# Patient Record
Sex: MALE | Race: WHITE | NOT HISPANIC OR LATINO | URBAN - METROPOLITAN AREA
[De-identification: names, ages, dates, MRNs, and addresses within clinical notes are randomized per-mention and may not be internally consistent; named-entity substitution may affect disease eponyms.]

---

## 2017-03-15 ENCOUNTER — EMERGENCY (EMERGENCY)
Facility: HOSPITAL | Age: 22
LOS: 1 days | End: 2017-03-15
Admitting: EMERGENCY MEDICINE
Payer: COMMERCIAL

## 2017-03-15 PROCEDURE — 99284 EMERGENCY DEPT VISIT MOD MDM: CPT

## 2017-03-16 PROCEDURE — 80053 COMPREHEN METABOLIC PANEL: CPT

## 2017-03-16 PROCEDURE — 99284 EMERGENCY DEPT VISIT MOD MDM: CPT | Mod: 25

## 2017-03-16 PROCEDURE — 82150 ASSAY OF AMYLASE: CPT

## 2017-03-16 PROCEDURE — 74176 CT ABD & PELVIS W/O CONTRAST: CPT | Mod: 26

## 2017-03-16 PROCEDURE — 87633 RESP VIRUS 12-25 TARGETS: CPT

## 2017-03-16 PROCEDURE — 96375 TX/PRO/DX INJ NEW DRUG ADDON: CPT

## 2017-03-16 PROCEDURE — 87086 URINE CULTURE/COLONY COUNT: CPT

## 2017-03-16 PROCEDURE — 74176 CT ABD & PELVIS W/O CONTRAST: CPT

## 2017-03-16 PROCEDURE — 87400 INFLUENZA A/B EACH AG IA: CPT

## 2017-03-16 PROCEDURE — 87486 CHLMYD PNEUM DNA AMP PROBE: CPT

## 2017-03-16 PROCEDURE — 81003 URINALYSIS AUTO W/O SCOPE: CPT

## 2017-03-16 PROCEDURE — 96374 THER/PROPH/DIAG INJ IV PUSH: CPT

## 2017-03-16 PROCEDURE — 83690 ASSAY OF LIPASE: CPT

## 2017-03-16 PROCEDURE — 87798 DETECT AGENT NOS DNA AMP: CPT

## 2017-03-16 PROCEDURE — 85027 COMPLETE CBC AUTOMATED: CPT

## 2017-03-16 PROCEDURE — 87581 M.PNEUMON DNA AMP PROBE: CPT

## 2017-03-23 PROBLEM — Z00.00 ENCOUNTER FOR PREVENTIVE HEALTH EXAMINATION: Status: ACTIVE | Noted: 2017-03-23

## 2019-10-31 ENCOUNTER — EMERGENCY (EMERGENCY)
Facility: HOSPITAL | Age: 24
LOS: 1 days | Discharge: HOME | End: 2019-10-31
Attending: EMERGENCY MEDICINE | Admitting: EMERGENCY MEDICINE
Payer: SELF-PAY

## 2019-10-31 VITALS
OXYGEN SATURATION: 99 % | RESPIRATION RATE: 15 BRPM | DIASTOLIC BLOOD PRESSURE: 88 MMHG | SYSTOLIC BLOOD PRESSURE: 150 MMHG | TEMPERATURE: 99 F | HEIGHT: 67 IN | WEIGHT: 184.97 LBS | HEART RATE: 90 BPM

## 2019-10-31 PROCEDURE — 99283 EMERGENCY DEPT VISIT LOW MDM: CPT

## 2019-10-31 RX ORDER — CEPHALEXIN 500 MG
500 CAPSULE ORAL ONCE
Refills: 0 | Status: COMPLETED | OUTPATIENT
Start: 2019-10-31 | End: 2019-10-31

## 2019-10-31 RX ORDER — CEPHALEXIN 500 MG
1 CAPSULE ORAL
Qty: 28 | Refills: 0
Start: 2019-10-31 | End: 2019-11-06

## 2019-10-31 RX ADMIN — Medication 500 MILLIGRAM(S): at 18:12

## 2019-10-31 NOTE — ED ADULT NURSE NOTE - NSIMPLEMENTINTERV_GEN_ALL_ED
Implemented All Universal Safety Interventions:  [place] to call system. Call bell, personal items and telephone within reach. Instruct patient to call for assistance. Room bathroom lighting operational. Non-slip footwear when patient is off stretcher. Physically safe environment: no spills, clutter or unnecessary equipment. Stretcher in lowest position, wheels locked, appropriate side rails in place.

## 2019-10-31 NOTE — ED PROVIDER NOTE - PATIENT PORTAL LINK FT
You can access the FollowMyHealth Patient Portal offered by [facility] by registering at the following website: [email or web address]. By joining [employer]’s FollowMyHealth portal, you will also be able to view your health information using other applications (apps) compatible with our system.

## 2019-10-31 NOTE — ED ADULT NURSE NOTE - CAS ELECT INFOMATION PROVIDED
DC instructions/pt advised to elevate hand and watch for rednes swelling pus d/c and/or fever and to follow up hand /plastc md pt also advised onhow to take antibiotics

## 2019-10-31 NOTE — ED PROVIDER NOTE - OBJECTIVE STATEMENT
Pt is a 25 yo RHD male with no pmhx here for laceration on right middle finger at work pta pt was cleaning  and sustained cut tdap up to date 6 years ago. pt denies any numbness tingling weakness

## 2019-10-31 NOTE — ED PROVIDER NOTE - SKIN, MLM
Skin normal color for race, warm, right middle finger superficial triangular shaped skin avulsion pulp no nailbed injury nvi nrom normal cap refill normal sensation pulses sensation

## 2019-10-31 NOTE — ED PROVIDER NOTE - PRINCIPAL DIAGNOSIS
Laceration of middle finger without foreign body without damage to nail, unspecified laterality, initial encounter

## 2019-10-31 NOTE — ED PROVIDER NOTE - ATTENDING CONTRIBUTION TO CARE
Cecilio CATALAN for ED attending, Dr. Haro  : 25 y/o male presents to the ED c/o laceration to right 3rd finger with a  while at work today. No numbness or tingling. Right hand dominant. Tetanus UTD.  Pt with continued bleeding despite pressure. Denies lightheadedness.  Denies additional injury  PE: right hand 3rd digit 1.5cm avulsion to distal tip, no nailbed involvement, no bony tenderness, FROM intact, cap refill less than 2 seconds. Sensation grossly intact +radial pulse   MDM: will clean and dress wound and put pt on abx. Agree with above plan of care.

## 2019-10-31 NOTE — ED PROVIDER NOTE - PROGRESS NOTE DETAILS
Cecilio CATALAN for ED attending, Dr. Haro  : 23 y/o male presents to the ED c/o laceration to right 3rd finger with a  while at work today. No numbness or tingling. Right hand dominant. Tetanus UTD.    PE: right hand 3rd digit 1.5cm avulsion to distal tip, no nailbed involvement, no bony tenderness, FROM intact, cap refill less than 2 seconds.   MDM: will clean and dress wound and put pt on abx. Agree with above plan of care.

## 2019-10-31 NOTE — ED PROVIDER NOTE - CARE PLAN
Principal Discharge DX:	Laceration of middle finger without foreign body without damage to nail, unspecified laterality, initial encounter

## 2019-10-31 NOTE — ED PROVIDER NOTE - CARE PROVIDER_API CALL
Arslan Jennings)  Plastic Surgery; Surgery  [address], [address]  [place] [address]  Phone: [phone]  Fax: [phone]  Follow Up Time:

## 2019-10-31 NOTE — ED PROVIDER NOTE - CLINICAL SUMMARY MEDICAL DECISION MAKING FREE TEXT BOX
Pt is a 25 yo male with laceration avulsion no skin to repair   wound soaked and irrigated in saline and betadine petroleum gauze dressing placed no bleeding nvi gauze placed on abx stable for d/c advised plastics f/u

## 2019-10-31 NOTE — ED ADULT NURSE REASSESSMENT NOTE - NS ED NURSE REASSESS COMMENT FT1
pt re evaluated by md and to be d'c/d  pt discharged stable and ambulatory in nad at present d/c instruction reinforced and pt verbalized understanding vital signs as charted wound care instructions given and pt verbalized understanding

## 2019-10-31 NOTE — ED PROVIDER NOTE - NSFOLLOWUPINSTRUCTIONS_ED_ALL_ED_FT
Please follow up with plastics take antibiotics return for fever redness drainage worsening symptoms

## 2020-06-05 PROBLEM — Z78.9 OTHER SPECIFIED HEALTH STATUS: Chronic | Status: ACTIVE | Noted: 2019-10-31

## 2020-06-10 ENCOUNTER — APPOINTMENT (OUTPATIENT)
Dept: MRI IMAGING | Facility: CLINIC | Age: 25
End: 2020-06-10

## 2020-07-02 ENCOUNTER — OUTPATIENT (OUTPATIENT)
Dept: OUTPATIENT SERVICES | Facility: HOSPITAL | Age: 25
LOS: 1 days | End: 2020-07-02
Payer: COMMERCIAL

## 2020-07-02 ENCOUNTER — APPOINTMENT (OUTPATIENT)
Dept: MRI IMAGING | Facility: CLINIC | Age: 25
End: 2020-07-02
Payer: COMMERCIAL

## 2020-07-02 DIAGNOSIS — Z00.8 ENCOUNTER FOR OTHER GENERAL EXAMINATION: ICD-10-CM

## 2020-07-02 PROCEDURE — 73718 MRI LOWER EXTREMITY W/O DYE: CPT

## 2020-07-02 PROCEDURE — 73718 MRI LOWER EXTREMITY W/O DYE: CPT | Mod: 26,RT

## 2022-07-27 ENCOUNTER — NON-APPOINTMENT (OUTPATIENT)
Age: 27
End: 2022-07-27